# Patient Record
Sex: FEMALE | Race: OTHER | HISPANIC OR LATINO | URBAN - METROPOLITAN AREA
[De-identification: names, ages, dates, MRNs, and addresses within clinical notes are randomized per-mention and may not be internally consistent; named-entity substitution may affect disease eponyms.]

---

## 2022-11-16 ENCOUNTER — EMERGENCY (EMERGENCY)
Facility: HOSPITAL | Age: 22
LOS: 1 days | Discharge: HOME | End: 2022-11-16
Attending: EMERGENCY MEDICINE
Payer: COMMERCIAL

## 2022-11-16 VITALS
DIASTOLIC BLOOD PRESSURE: 70 MMHG | TEMPERATURE: 98 F | SYSTOLIC BLOOD PRESSURE: 106 MMHG | HEART RATE: 87 BPM | OXYGEN SATURATION: 98 % | RESPIRATION RATE: 18 BRPM

## 2022-11-16 VITALS
OXYGEN SATURATION: 96 % | RESPIRATION RATE: 16 BRPM | HEIGHT: 66 IN | SYSTOLIC BLOOD PRESSURE: 134 MMHG | DIASTOLIC BLOOD PRESSURE: 86 MMHG | TEMPERATURE: 99 F | WEIGHT: 160.06 LBS | HEART RATE: 86 BPM

## 2022-11-16 PROCEDURE — 72125 CT NECK SPINE W/O DYE: CPT | Mod: MA

## 2022-11-16 PROCEDURE — 99284 EMERGENCY DEPT VISIT MOD MDM: CPT

## 2022-11-16 PROCEDURE — 72100 X-RAY EXAM L-S SPINE 2/3 VWS: CPT

## 2022-11-16 PROCEDURE — 72070 X-RAY EXAM THORAC SPINE 2VWS: CPT | Mod: 26

## 2022-11-16 PROCEDURE — 96372 THER/PROPH/DIAG INJ SC/IM: CPT

## 2022-11-16 PROCEDURE — 71045 X-RAY EXAM CHEST 1 VIEW: CPT

## 2022-11-16 PROCEDURE — 99284 EMERGENCY DEPT VISIT MOD MDM: CPT | Mod: 25

## 2022-11-16 PROCEDURE — 72070 X-RAY EXAM THORAC SPINE 2VWS: CPT

## 2022-11-16 PROCEDURE — 72125 CT NECK SPINE W/O DYE: CPT | Mod: 26,MA

## 2022-11-16 PROCEDURE — 72170 X-RAY EXAM OF PELVIS: CPT

## 2022-11-16 PROCEDURE — 71045 X-RAY EXAM CHEST 1 VIEW: CPT | Mod: 26

## 2022-11-16 PROCEDURE — 72170 X-RAY EXAM OF PELVIS: CPT | Mod: 26

## 2022-11-16 PROCEDURE — 72100 X-RAY EXAM L-S SPINE 2/3 VWS: CPT | Mod: 26

## 2022-11-16 RX ORDER — KETOROLAC TROMETHAMINE 30 MG/ML
30 SYRINGE (ML) INJECTION ONCE
Refills: 0 | Status: DISCONTINUED | OUTPATIENT
Start: 2022-11-16 | End: 2022-11-16

## 2022-11-16 RX ORDER — IBUPROFEN 200 MG
1 TABLET ORAL
Qty: 40 | Refills: 0
Start: 2022-11-16 | End: 2022-11-25

## 2022-11-16 RX ADMIN — Medication 30 MILLIGRAM(S): at 13:23

## 2022-11-16 NOTE — ED PROVIDER NOTE - PATIENT PORTAL LINK FT
You can access the FollowMyHealth Patient Portal offered by [facility] by registering at the following website: [email or web address]. By joining [employer]’s FollowMyHealth portal, you will also be able to view your health information using other applications (apps) compatible with our system.

## 2022-11-16 NOTE — ED PROVIDER NOTE - PHYSICAL EXAMINATION
Physical Exam:   GEN: in no acute distress, AAOx3  HENT: NCAT, MMM, no stridor. mild midline neck pain with no crepitus, stepoff, discoloration or swelling   EYES: PERRLA, EOMI  RESP: CTAB, no respiratory distress  Chest: Minimal left chest was pain with no crepitus, swelling, or eccymosis   CV: normal rate and regular rhythm, S1 S2  Back: midline tenderness in lumbar and thoracioc reion with no crei[ptus, swelling, color channge or stepoff   ABD: soft and NTND  EXT: No edema, No bony deformity of extremities  SKIN: No skin breaks, skin color normal for race  NEURO: CN grossly intact, No focal motor or sensory deficits. Physical Exam:   GEN: in no acute distress, AAOx3  HENT: NCAT, MMM, no stridor. mild midline neck pain with no crepitus, stepoff, discoloration or swelling   EYES: PERRLA, EOMI  RESP: CTAB, no respiratory distress  Chest: Minimal left chest was pain with no crepitus, swelling, or eccymosis   CV: normal rate and regular rhythm, S1 S2  Back: C-spine with midline tenderness but no midline TTP to thoracic and lumbar spine; no step off, deformity or crepitus, swelling, or color change ; C5-T1 and L2-S1 intact to motor and sensation   ABD: soft and NTND  EXT: No edema, No bony deformity of extremities  SKIN: No skin breaks, skin color normal for race  NEURO: CN grossly intact, No focal motor or sensory deficits.
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
## 2022-11-16 NOTE — ED PROVIDER NOTE - CLINICAL SUMMARY MEDICAL DECISION MAKING FREE TEXT BOX
Patient appearing well, imaging did not reveal any acute fractures dislocations or any abnormalities, patient appearing well and feeling well after medication here with Toradol, will discharge with prescription for ibuprofen with instructions return for any new severe symptoms.  Patient understood instructions and was discharged stable condition. Patient appearing well, imaging did not reveal any acute fractures dislocations or any abnormalities, patient appearing well and feeling well after medication here with Toradol, will discharge with prescription for ibuprofen with instructions return for any new severe symptoms.  Patient understood instructions and was discharged stable condition. pt cleared from c-collar at bedside, able to move neck with no issues.
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
